# Patient Record
Sex: MALE | Race: WHITE | Employment: FULL TIME | URBAN - METROPOLITAN AREA
[De-identification: names, ages, dates, MRNs, and addresses within clinical notes are randomized per-mention and may not be internally consistent; named-entity substitution may affect disease eponyms.]

---

## 2024-04-19 ENCOUNTER — TELEPHONE (OUTPATIENT)
Age: 29
End: 2024-04-19

## 2024-04-19 NOTE — TELEPHONE ENCOUNTER
Dr. Tinoco ([facility]) would like to speak to one of the providers about this patient if possible. He is requesting Dr. Wright but is ok with speaking to any of the providers since Dr. Wright is out.     He is back in his office on Monday and the number is [phone]. He said to tell whoever answers the phone to come get him.    Patient needs to be seen for his Testerone level (132?). Labs were done at [facility]. Patient does not have a PCP.    He would like the patient scheduled preferably with Dr. Wright but is ok with anyone opening to talking to the patient about this. He is going to fax over records and is willing to order any tests needed for this appointment to happen. Patient does not have a pcp.   Can clerical staff reach out to patient to schedule with Dr. Wright when we have records needed?    Patient's insurance is [employer] P[employer].

## 2024-04-23 ENCOUNTER — TELEPHONE (OUTPATIENT)
Age: 29
End: 2024-04-23

## 2024-04-23 NOTE — TELEPHONE ENCOUNTER
Pt called in to scheduled an appt. Pt is now scheduled with Dr. Lee for 05/02/2024. Pt is aware and confirmed

## 2024-04-24 ENCOUNTER — TELEPHONE (OUTPATIENT)
Age: 29
End: 2024-04-24

## 2024-04-24 NOTE — TELEPHONE ENCOUNTER
Dr. Say Tinoco would appreciate if Dr. Wright could call him regarding this patient.  Dr. Tinoco faxed documents over for Dr. Wright to review.

## 2024-05-01 NOTE — PROGRESS NOTES
Jacob Ridley 29 y.o. male MRN: [id number]    Encounter: [id number]      Assessment/Plan     Assessment:  This is a 29 y.o.-year-old male with Salmonella infection summer 2022 complicated by reactive arthritis and continued muscle and joint pains.  Patient recently saw functional medicine who performed laboratory testing as part of workup that resulted a low testosterone, mildly elevated SHBG and mildly elevated total protein, as well as vitamin D deficiency.  Labs were drawn in the afternoon, when patient was on a primarily meat based diet..  Patient did have episode of testicular pain several years ago that was diagnosed as epididymitis and treated with antibiotics.  Does not appear to have had STD screening.  Does currently suffer from low libido as well as mood swings primarily depressive.  Is a daily marijuana user.    Plan:  --Discussed with patient that there are multiple etiologies of fatigue and myalgias.  He is following with rheumatology and with functional medicine regarding vitamin D supplementation as well as other pain management.    -- Will repeat testosterone, LH, FSH, SHBG, CMP fasting,, early morning, 2 hours after awakening.  -- Will check HIV once due to elevated blood proteins and SHBG however more likely suspect that elevated protein is due to pure carnivore diet at the time previous labs were taken  --Will defer further liver workup pending CMP however could consider hepatitis evaluation  -- Will defer further STD testing to PCP as patient currently asymptomatic  --Follow-up to be determined after lab results  -- Patient would be a poor candidate for testosterone replacement as desires fertility in the coming year, if testosterone returns low would consider alternate options     CC: Hypogonadism    History of Present Illness     HPI:  29 y.o. year old male with past medical history significant for anxiety, depression, hyperlipidemia, reactive arthritis.  Presenting in evaluation for low  testosterone.  Patient saw a functional medicine/chiropractic practitioner in March for a variety of complaints primarily pain related to his reactive arthritis.  Had extensive evaluation including lab work.   Got salmonella July 2022, then reactive arthirits, which returned 2023 summer, hasn't left.  Overall doing somewhat better in terms of fatigue and pain but continued difficulties about past year.  Did see rheumatology, was on NSAIDs, then functional medicine.    4/16/2024 FSH 2.8 LH 4.4 Prolactin 20  4/5/2024 TSH 1.63 fT4 1.53 testosterone 132.2 free testosteron 12.6 SHBG 78.9 Vit D 28.5 Albumin 5.4 ferritin 267 *done on pure carnivore diet in the afternoon  3/29/2022 testicular ultrasound for pain, with normal vascular flow, heterogenous echogenicity bilaterally, right testicle 16cc, left testicle 20cc when calculated off the measurements.     Puberty little delayed   Libido:low x 6-7 months  Sex: ok w erections, ejaculation when in mood   Spontaneous erections:never really had   Breast discomfort: no gynecomastia: always larger tissue for man fo, galactorrhea: no, reduced axillary/pubic hair:no , shaving frequenc:no, small/shrinking testes: no, Hot flashes, sweats: wakes up sweating a few times a week   Children: no, wants kids within the year   Family history of hypogonadism: maybe GF w low T as got older   Anabolic steroids/body building agents: no  Glucocorticoids: no  Sleep apnea, polycythemia: no  Alcohol rare, drugs no, opioids no, marijuana daily, anticonvulsants: no  History of nephrotic syndrome, Hepatits/cirrhosis, diabetes, HIV, hypothyrodism: no  History of head injuries or genital trauma: maybe concussion when younger  Prior testosterone use: never  Herbal meds, ED meds, OTC meds, supplements : multivitamin   Fractures: no    Review of Systems   Constitutional:  Positive for fatigue. Negative for unexpected weight change.        Lost weight on carnivore diet, serious since February , meat veg  "fruit    Respiratory:  Negative for cough and shortness of breath.    Cardiovascular:  Negative for chest pain and palpitations.   Gastrointestinal:  Positive for constipation. Negative for abdominal distention and diarrhea.   Genitourinary:  Negative for decreased urine volume, difficulty urinating, frequency, scrotal swelling, testicular pain and urgency.        Had episode testicular pain last year    Psychiatric/Behavioral:  Positive for dysphoric mood. Negative for agitation and confusion.        Historical Information   History reviewed. No pertinent past medical history.  History reviewed. No pertinent surgical history.  Social History   Social History     Substance and Sexual Activity   Alcohol Use Not Currently     Social History     Substance and Sexual Activity   Drug Use Yes    Types: Marijuana     Social History     Tobacco Use   Smoking Status Never   Smokeless Tobacco Never     Family History: History reviewed. No pertinent family history.    Meds/Allergies   Current Outpatient Medications   Medication Sig Dispense Refill    Multiple Vitamins-Minerals (MULTIVITAL PO) Take by mouth       No current facility-administered medications for this visit.     Allergies   Allergen Reactions    Pseudoephedrine Anxiety       Objective   Vitals: Blood pressure 110/80, pulse 83, height 5' 9\" (1.753 m), weight 67.9 kg (149 lb 9.6 oz).    Physical Exam  Constitutional:       General: He is not in acute distress.     Appearance: Normal appearance. He is not ill-appearing, toxic-appearing or diaphoretic.   HENT:      Head: Normocephalic and atraumatic.      Nose: Nose normal.   Eyes:      Extraocular Movements: Extraocular movements intact.      Conjunctiva/sclera: Conjunctivae normal.      Pupils: Pupils are equal, round, and reactive to light.   Cardiovascular:      Rate and Rhythm: Normal rate.   Pulmonary:      Effort: Pulmonary effort is normal. No respiratory distress.   Abdominal:      General: There is no " "distension.      Palpations: There is no mass.      Tenderness: There is no abdominal tenderness. There is no guarding or rebound.      Hernia: No hernia is present.   Musculoskeletal:         General: No deformity.      Right lower leg: No edema.      Left lower leg: No edema.   Skin:     General: Skin is warm and dry.   Neurological:      General: No focal deficit present.      Mental Status: He is alert. Mental status is at baseline.   Psychiatric:         Mood and Affect: Mood normal.         Behavior: Behavior normal.         Thought Content: Thought content normal.         The history was obtained from the review of the chart, patient.    Lab Results:        Imaging Studies:         I have personally reviewed pertinent reports.      Portions of the record may have been created with voice recognition software. Occasional wrong word or \"sound a like\" substitutions may have occurred due to the inherent limitations of voice recognition software. Read the chart carefully and recognize, using context, where substitutions have occurred.    "

## 2024-05-02 ENCOUNTER — CONSULT (OUTPATIENT)
Dept: ENDOCRINOLOGY | Facility: CLINIC | Age: 29
End: 2024-05-02
Payer: COMMERCIAL

## 2024-05-02 VITALS
WEIGHT: 149.6 LBS | HEIGHT: 69 IN | SYSTOLIC BLOOD PRESSURE: 110 MMHG | BODY MASS INDEX: 22.16 KG/M2 | HEART RATE: 83 BPM | DIASTOLIC BLOOD PRESSURE: 80 MMHG

## 2024-05-02 DIAGNOSIS — R79.89 LOW TESTOSTERONE IN MALE: Primary | ICD-10-CM

## 2024-05-02 PROCEDURE — 99204 OFFICE O/P NEW MOD 45 MIN: CPT | Performed by: INTERNAL MEDICINE

## 2024-05-02 NOTE — PATIENT INSTRUCTIONS
--get labs in the morning before 9am, after a good nights sleep, fasting, at least 2h after waking up   --take an additional daily vitamin d 2000u

## 2024-05-07 LAB
ALBUMIN SERPL-MCNC: 4.8 G/DL (ref 4.3–5.2)
ALBUMIN/GLOB SERPL: 1.6 {RATIO} (ref 1.2–2.2)
ALP SERPL-CCNC: 96 IU/L (ref 44–121)
ALT SERPL-CCNC: 37 IU/L (ref 0–44)
AST SERPL-CCNC: 31 IU/L (ref 0–40)
BILIRUB SERPL-MCNC: 0.4 MG/DL (ref 0–1.2)
BUN SERPL-MCNC: 21 MG/DL (ref 6–20)
BUN/CREAT SERPL: 20 (ref 9–20)
CALCIUM SERPL-MCNC: 10.1 MG/DL (ref 8.7–10.2)
CHLORIDE SERPL-SCNC: 102 MMOL/L (ref 96–106)
CO2 SERPL-SCNC: 23 MMOL/L (ref 20–29)
CREAT SERPL-MCNC: 1.03 MG/DL (ref 0.76–1.27)
EGFR: 101 ML/MIN/1.73
FSH SERPL-ACNC: 2.9 MIU/ML (ref 1.5–12.4)
GLOBULIN SER-MCNC: 3 G/DL (ref 1.5–4.5)
GLUCOSE SERPL-MCNC: 93 MG/DL (ref 70–99)
HIV 1+2 AB+HIV1 P24 AG SERPL QL IA: NON REACTIVE
HIV 2 RNA SERPL QL NAA+PROBE: NON REACTIVE
HIV1 RNA SERPL QL NAA+PROBE: NON REACTIVE
LH SERPL-ACNC: 4.1 MIU/ML (ref 1.7–8.6)
POTASSIUM SERPL-SCNC: 5 MMOL/L (ref 3.5–5.2)
PROT SERPL-MCNC: 7.8 G/DL (ref 6–8.5)
SHBG SERPL-SCNC: 61.6 NMOL/L (ref 16.5–55.9)
SODIUM SERPL-SCNC: 138 MMOL/L (ref 134–144)
TESTOST FREE SERPL-MCNC: 5.8 PG/ML (ref 9.3–26.5)
TESTOST SERPL-MCNC: 464 NG/DL (ref 264–916)

## 2024-05-09 DIAGNOSIS — R79.89 LOW TESTOSTERONE IN MALE: Primary | ICD-10-CM

## 2024-05-20 ENCOUNTER — TELEPHONE (OUTPATIENT)
Dept: ENDOCRINOLOGY | Facility: CLINIC | Age: 29
End: 2024-05-20

## 2024-05-20 NOTE — TELEPHONE ENCOUNTER
"----- Message from Rissa Lee DO sent at 5/9/2024  3:28 PM EDT -----  Please call the patient regarding his abnormal result.    Total testosterone was good at 464.  His blood protein levels are at the higher end of normal and his testosterone binding protein (the SHBG) is still slightly above normal, which is still making the free blood testosterone look low at 5.8 . HIV was negative so the slightly high protein might be due to his diet.    I have ordered another set of testosterone labs that are less affected by blood protein levels.  The office will be sending him the prescription for \"testosterone free and total LC/MS\" which he should also do in the morning 7-9am  "

## 2024-06-03 LAB
TESTOST FREE SERPL-MCNC: 8.8 PG/ML (ref 9.3–26.5)
TESTOST SERPL-MCNC: 577.8 NG/DL (ref 264–916)

## 2025-03-11 ENCOUNTER — TELEPHONE (OUTPATIENT)
Age: 30
End: 2025-03-11

## 2025-03-11 NOTE — TELEPHONE ENCOUNTER
New Patient    Appointment Scheduling  What office location does the patient prefer?: [facility]  What is the reason for the patient's appointment?: New Patient, testicular pain  Have patient records been requested?: no  If No, are the records showing in Epic: yes    Appointment Details  Date: 3/12/2025  Time:    11 am   Location:   [facility]  Provider:  Clarisa REYES  Does the appointment need further review? (Reason) N/A      HISTORY  Is the patient having active symptoms? If so, describe symptoms: Severe stabbing testicular pain    Has the patient had any previous Urologist(s)?: No    Was the patient seen in the ED?: No    Has the patient had any outside testing done?: No  Does patient have Imaging/Lab Results: No  Have you had Cancer Unknown    INSURANCE   Have you confirmed Patient's insurance? Yes  Is the insurance accepted?  Yes  Is the insurance active? Could not get to go through. Unsure if right Member ID was given

## 2025-03-11 NOTE — TELEPHONE ENCOUNTER
Was trying to verify patient's insurance. Came back as wrong subscriber ID    Can this please be checked via the card when the patient comes in?    Informed patient to arrive sooner than appt time to get his insurance in the system.

## 2025-03-11 NOTE — PROGRESS NOTES
3/12/2025      No chief complaint on file.    Assessment and Plan    29 y.o. male managed by New patient       1. Pain in scrotum  Assessment & Plan:  Left-sided testicular pain  Intermittent- more at night   UA- unable to leave sample today   UA placed for outpatient   Last US from 2022 reviewed with patient   Plan to get updated US   Reviewed comfort measures and ED precautions   Reviewed potential for referred lower back pain to the testicles  We will call with ultrasound and urine results  Orders:  -     US scrotum and testicles; Future; Expected date: 03/12/2025  -     UA w Reflex to Microscopic w Reflex to Culture; Future  2. Premature ejaculation  Assessment & Plan:  Reports ejaculating less than a minute  No issues with obtaining erection  Reviewed over-the-counter products along with lidocaine gel  Patient interested in over-the-counter products  List provided  Reviewed usage  Patient plans to call if interested in lidocaine gel-work on will need to be used      History of Present Illness  Jacob Ridley is a 29 y.o. male here for evaluation of intermittent left testicular pain.  He reports the same as katharine.  He reports majority of pain is at nighttime.  He has not tried any comfort measures.  He reports that he initially started with testicular pain back in 2022 after a Salmonella infection.  He reports ever since then get his baseline right.  He additionally reports chronic joint pain.  He continues to follow with his PCP.  He did have an ultrasound completed in 2022 which did show a small epididymal head cyst 1 mm and concern for right epididymitis.  Patient reports that he was treated at that time.    He denies any issues with urination.  He denies testicular swelling or redness to the area.  No concern for STDs as he has been with the same partner for some time now.    No recent trauma or testicular injury per patient.    Additionally reports premature ejaculation which started after a Salmonella  infection back in 2022.  He reports ejaculating in less than a minute.  He denies any issues with obtaining erections.     He did have his testosterone level checked through functional doctor.  Most recent level was in the 500s.      Previous US- was 2022-Negative sonogram of the testicles.  Normal vascular flow.   2. Small epididymal head cyst right epididymis.   3. Otherwise negative examination       Review of Systems   Constitutional:  Negative for chills and fever.   HENT:  Negative for ear pain and sore throat.    Eyes:  Negative for pain and visual disturbance.   Respiratory:  Negative for cough and shortness of breath.    Cardiovascular:  Negative for chest pain and palpitations.   Gastrointestinal:  Negative for abdominal pain and vomiting.   Genitourinary:  Positive for testicular pain (intermittent L side). Negative for dysuria, frequency, hematuria, penile pain, penile swelling and scrotal swelling.   Musculoskeletal:  Negative for arthralgias and back pain.   Skin:  Negative for color change and rash.   Neurological:  Negative for seizures and syncope.   All other systems reviewed and are negative.               Vitals  Vitals:    03/12/25 1042   BP: 120/80   BP Location: Left arm   Patient Position: Sitting   Cuff Size: Standard   Pulse: 61   SpO2: 99%   Weight: 68.9 kg (152 lb)       Physical Exam  Vitals reviewed.   Constitutional:       Appearance: Normal appearance.   HENT:      Head: Normocephalic and atraumatic.   Eyes:      Conjunctiva/sclera: Conjunctivae normal.   Pulmonary:      Effort: Pulmonary effort is normal.   Abdominal:      General: Abdomen is flat. There is no distension.      Palpations: Abdomen is soft.      Tenderness: There is no abdominal tenderness.   Genitourinary:     Penis: Normal and circumcised. No erythema, discharge, swelling or lesions.       Testes: Normal.         Right: Mass, tenderness, swelling, testicular hydrocele or varicocele not present. Cremasteric reflex is  present.          Left: Mass, tenderness, swelling, testicular hydrocele or varicocele not present.      Epididymis:      Right: Normal. Not enlarged. No tenderness.      Left: Normal. Not enlarged. No tenderness.   Musculoskeletal:         General: Normal range of motion.      Cervical back: Normal range of motion.   Skin:     General: Skin is warm and dry.   Neurological:      General: No focal deficit present.      Mental Status: He is alert and oriented to person, place, and time.   Psychiatric:         Mood and Affect: Mood normal.         Behavior: Behavior normal.         Thought Content: Thought content normal.         Judgment: Judgment normal.           Past History  No past medical history on file.  Social History     Socioeconomic History   • Marital status: Single     Spouse name: Not on file   • Number of children: Not on file   • Years of education: Not on file   • Highest education level: Not on file   Occupational History   • Not on file   Tobacco Use   • Smoking status: Never   • Smokeless tobacco: Never   Substance and Sexual Activity   • Alcohol use: Not Currently   • Drug use: Yes     Types: Marijuana   • Sexual activity: Not on file   Other Topics Concern   • Not on file   Social History Narrative   • Not on file     Social Drivers of Health     Financial Resource Strain: Not on file   Food Insecurity: Not on file   Transportation Needs: Not on file   Physical Activity: Not on file   Stress: Not on file   Social Connections: Not on file   Intimate Partner Violence: Not on file   Housing Stability: Not on file     Social History     Tobacco Use   Smoking Status Never   Smokeless Tobacco Never     No family history on file.    The following portions of the patient's history were reviewed and updated as appropriate: allergies, current medications, past medical history, past social history, past surgical history and problem list.    Results  No results found for this or any previous visit (from the  "past hour).]  No results found for: \"PSA\"  Lab Results   Component Value Date    K 5.0 05/03/2024    CO2 23 05/03/2024     05/03/2024    BUN 21 (H) 05/03/2024    CREATININE 1.03 05/03/2024     No results found for: \"WBC\", \"HGB\", \"HCT\", \"MCV\", \"PLT\"    "

## 2025-03-12 ENCOUNTER — OFFICE VISIT (OUTPATIENT)
Dept: UROLOGY | Facility: CLINIC | Age: 30
End: 2025-03-12

## 2025-03-12 VITALS
SYSTOLIC BLOOD PRESSURE: 120 MMHG | WEIGHT: 152 LBS | OXYGEN SATURATION: 99 % | BODY MASS INDEX: 22.45 KG/M2 | DIASTOLIC BLOOD PRESSURE: 80 MMHG | HEART RATE: 61 BPM

## 2025-03-12 DIAGNOSIS — N50.82 PAIN IN SCROTUM: Primary | ICD-10-CM

## 2025-03-12 DIAGNOSIS — F52.4 PREMATURE EJACULATION: ICD-10-CM

## 2025-03-12 RX ORDER — CELECOXIB 50 MG/1
50 CAPSULE ORAL 2 TIMES DAILY
COMMUNITY

## 2025-03-12 NOTE — ASSESSMENT & PLAN NOTE
Reports ejaculating less than a minute  No issues with obtaining erection  Reviewed over-the-counter products along with lidocaine gel  Patient interested in over-the-counter products  List provided  Reviewed usage  Patient plans to call if interested in lidocaine gel-work on will need to be used

## 2025-03-12 NOTE — ASSESSMENT & PLAN NOTE
Left-sided testicular pain  Intermittent- more at night   UA- unable to leave sample today   UA placed for outpatient   Last US from 2022 reviewed with patient   Plan to get updated US   Reviewed comfort measures and ED precautions   Reviewed potential for referred lower back pain to the testicles  We will call with ultrasound and urine results

## 2025-03-12 NOTE — PATIENT INSTRUCTIONS
Trainer- Buzz -would order offline   Promescent- OTC- less messy and no condom needed   Tano swipes-OTC wipes     Prescription: let me know if interested   Lidocaine topical gel 2%- is messy at time and would need to wear a condom- so doesn't affect patient partner

## 2025-03-13 ENCOUNTER — RESULTS FOLLOW-UP (OUTPATIENT)
Dept: UROLOGY | Facility: CLINIC | Age: 30
End: 2025-03-13

## 2025-03-13 ENCOUNTER — APPOINTMENT (OUTPATIENT)
Dept: LAB | Facility: HOSPITAL | Age: 30
End: 2025-03-13
Payer: COMMERCIAL

## 2025-03-13 ENCOUNTER — HOSPITAL ENCOUNTER (OUTPATIENT)
Dept: ULTRASOUND IMAGING | Facility: HOSPITAL | Age: 30
Discharge: HOME | End: 2025-03-13
Payer: COMMERCIAL

## 2025-03-13 DIAGNOSIS — N50.82 PAIN IN SCROTUM: ICD-10-CM

## 2025-03-13 LAB
BILIRUB UR QL STRIP: NEGATIVE
CLARITY UR: CLEAR
COLOR UR: NORMAL
GLUCOSE UR STRIP-MCNC: NEGATIVE MG/DL
HGB UR QL STRIP.AUTO: NEGATIVE
KETONES UR STRIP-MCNC: NEGATIVE MG/DL
LEUKOCYTE ESTERASE UR QL STRIP: NEGATIVE
NITRITE UR QL STRIP: NEGATIVE
PH UR STRIP.AUTO: 5.5 [PH]
PROT UR STRIP-MCNC: NEGATIVE MG/DL
SP GR UR STRIP.AUTO: 1.02 (ref 1–1.03)
UROBILINOGEN UR STRIP-ACNC: <2 MG/DL

## 2025-03-13 PROCEDURE — 76870 US EXAM SCROTUM: CPT

## 2025-03-13 PROCEDURE — 81003 URINALYSIS AUTO W/O SCOPE: CPT

## 2025-03-20 NOTE — TELEPHONE ENCOUNTER
Contacted patient and reviewed US scrotum and testicles and LILLIAM Mckenna's note and recommendations. Advised patient he can follow up as needed. He verbalized understanding.

## 2025-03-20 NOTE — TELEPHONE ENCOUNTER
----- Message from LILLIAM Mittal sent at 3/19/2025  3:50 PM EDT -----  No concerning masses, lesions or concern for infection seen on imaging. Mild left-sided varicocele noted. This is very very small- would recommend comfort measures, scrotal support, and OTC analgesics for comfort. Not of concern. Can follow up as needed.